# Patient Record
Sex: FEMALE | NOT HISPANIC OR LATINO | Employment: OTHER | URBAN - METROPOLITAN AREA
[De-identification: names, ages, dates, MRNs, and addresses within clinical notes are randomized per-mention and may not be internally consistent; named-entity substitution may affect disease eponyms.]

---

## 2020-12-02 ENCOUNTER — LAB REQUISITION (OUTPATIENT)
Dept: LAB | Facility: HOSPITAL | Age: 70
End: 2020-12-02

## 2020-12-02 DIAGNOSIS — Z00.00 ENCOUNTER FOR GENERAL ADULT MEDICAL EXAMINATION WITHOUT ABNORMAL FINDINGS: ICD-10-CM

## 2020-12-02 PROCEDURE — U0004 COV-19 TEST NON-CDC HGH THRU: HCPCS | Performed by: OPHTHALMOLOGY

## 2020-12-03 LAB — SARS-COV-2 RNA RESP QL NAA+PROBE: NOT DETECTED

## 2024-03-05 ENCOUNTER — APPOINTMENT (OUTPATIENT)
Dept: GENERAL RADIOLOGY | Facility: HOSPITAL | Age: 74
End: 2024-03-05
Payer: MEDICARE

## 2024-03-05 ENCOUNTER — HOSPITAL ENCOUNTER (OUTPATIENT)
Facility: HOSPITAL | Age: 74
Discharge: HOME | End: 2024-03-05
Attending: EMERGENCY MEDICINE | Admitting: EMERGENCY MEDICINE
Payer: MEDICARE

## 2024-03-05 VITALS
HEART RATE: 89 BPM | BODY MASS INDEX: 32.78 KG/M2 | DIASTOLIC BLOOD PRESSURE: 78 MMHG | TEMPERATURE: 97.9 F | SYSTOLIC BLOOD PRESSURE: 138 MMHG | OXYGEN SATURATION: 95 % | RESPIRATION RATE: 20 BRPM | WEIGHT: 204 LBS | HEIGHT: 66 IN

## 2024-03-05 DIAGNOSIS — J18.9 PNEUMONIA OF RIGHT MIDDLE LOBE DUE TO INFECTIOUS ORGANISM: Primary | ICD-10-CM

## 2024-03-05 DIAGNOSIS — I10 ELEVATED BLOOD PRESSURE READING WITH DIAGNOSIS OF HYPERTENSION: ICD-10-CM

## 2024-03-05 LAB
FLUAV SUBTYP SPEC NAA+PROBE: NOT DETECTED
FLUBV RNA ISLT QL NAA+PROBE: NOT DETECTED
RSV RNA NPH QL NAA+NON-PROBE: NOT DETECTED
SARS-COV-2 RNA RESP QL NAA+PROBE: NOT DETECTED

## 2024-03-05 PROCEDURE — 87637 SARSCOV2&INF A&B&RSV AMP PRB: CPT

## 2024-03-05 PROCEDURE — G0463 HOSPITAL OUTPT CLINIC VISIT: HCPCS | Performed by: NURSE PRACTITIONER

## 2024-03-05 PROCEDURE — 71045 X-RAY EXAM CHEST 1 VIEW: CPT

## 2024-03-05 RX ORDER — ALBUTEROL SULFATE 90 UG/1
2 AEROSOL, METERED RESPIRATORY (INHALATION) EVERY 4 HOURS PRN
Qty: 6.7 G | Refills: 0 | Status: SHIPPED | OUTPATIENT
Start: 2024-03-05

## 2024-03-05 RX ORDER — AMOXICILLIN AND CLAVULANATE POTASSIUM 875; 125 MG/1; MG/1
1 TABLET, FILM COATED ORAL EVERY 12 HOURS
Qty: 14 TABLET | Refills: 0 | Status: SHIPPED | OUTPATIENT
Start: 2024-03-05 | End: 2024-03-12

## 2024-03-05 RX ORDER — PREDNISONE 20 MG/1
40 TABLET ORAL DAILY
Qty: 10 TABLET | Refills: 0 | Status: SHIPPED | OUTPATIENT
Start: 2024-03-05 | End: 2024-03-10

## 2024-03-05 RX ORDER — AZITHROMYCIN 250 MG/1
TABLET, FILM COATED ORAL
Qty: 6 TABLET | Refills: 0 | Status: SHIPPED | OUTPATIENT
Start: 2024-03-05

## 2024-03-05 NOTE — DISCHARGE INSTRUCTIONS
"Virus precautions, simple things to do at home to help with illness    You have been diagnosed with a non-COVID-19 viral illness, supportive care recommended.    Wash/sanitize common household surfaces with antibacterial wipes.  Especially door knobs, light switches.    Change bed linens and wash bath towels/washcloths    Frequent handwashing    Cough/sneeze into your sleeve    Treat fever every 6-8 hours with age appropriate Tylenol (generic acetaminophen) or Ibuprofen according to package directions.      Over-the-counter medications for symptomatic relief may include: Mucinex (maximum 3 days), Sudafed, DayQuil/NyQuil, flonase nasal spray.  If you have history of high blood pressure please use caution with these medications.  Check with pharmacist for recommendations.  Coricidin has brand \"HBP\" which is better for patient's with high blood pressure.     Over-the-counter supplements including vitamin C, zinc  may also be helpful.      Antibiotics as prescribed  Inhaler as needed  Steroids as prescribed    Return Precautions    Although you are being discharged from the ED today, I encourage you to return for worsening symptoms.  Things can, and do, change such that treatment at home with medication may not be adequate.      Specifically, return for any of the following:    Chest pain, shortness of breath, pain or nausea and vomiting not controlled by medications provided.    Please make a follow up with your Primary Care Provider for a blood pressure recheck.     "

## 2024-03-05 NOTE — [facility] PROVIDER NOTE
EMERGENCY DEPARTMENT ENCOUNTER    Room Number:  09/09  Date seen:  3/5/2024  Time seen: 12:34 EST  PCP: Leyda Escamilla APRN  Historian: patient, daughter  Pt speaks Nepali, understands English.  Requests daughter to help interpret    Discussed/obtained information from independent historians: n/a    HPI:  Chief complaint:cough, URI  A complete HPI/ROS/PMH/PSH/SH/FH are unobtainable due to:   Context:Tana Rangel is a 73 y.o. female with history of hypertension and tobacco abuse who presents to the ED with c/o days of moderate cough, fever, headaches and bodyaches.  Symptoms are not made better or worse by anything.  She does continue to use a vape device for tobacco but is reported decreased use.  She denies any known history of COPD.  Her symptoms are not made better by over-the-counter Delsym.  She denies any dyspnea on exertion    External (non-ED) record review: No recent notes or visit    Chronic or social conditions impacting care: Not applicable    ALLERGIES  Patient has no known allergies.    PAST MEDICAL HISTORY  Active Ambulatory Problems     Diagnosis Date Noted    No Active Ambulatory Problems     Resolved Ambulatory Problems     Diagnosis Date Noted    No Resolved Ambulatory Problems     No Additional Past Medical History       PAST SURGICAL HISTORY  No past surgical history on file.    FAMILY HISTORY  No family history on file.    SOCIAL HISTORY  Social History     Socioeconomic History    Marital status: Unknown       REVIEW OF SYSTEMS  Review of Systems    All systems reviewed and negative except for those discussed in HPI.     PHYSICAL EXAM    I have reviewed the triage vital signs and nursing notes.  Vitals:    03/05/24 1137   BP: (!) 185/61   Pulse: 89   Resp: 20   Temp: 97.9 °F (36.6 °C)   SpO2: 95%     Physical Exam    GENERAL: not distressed  HENT: nares patent, voice normal  EYES: no scleral icterus  NECK: no ROM limitations  CV: regular rhythm, regular rate, no  murmur  RESPIRATORY: normal effort, right lower lobe Rales.  Mild wheezing that is expiratory  ABDOMEN: soft  : deferred  MUSCULOSKELETAL: no deformity  NEURO: alert, moves all extremities, follows commands  SKIN: warm, dry    LAB RESULTS  Recent Results (from the past 24 hour(s))   COVID-19 and FLU A/B PCR, 1 HR TAT - Swab, Nasopharynx    Collection Time: 03/05/24 11:39 AM    Specimen: Nasopharynx; Swab   Result Value Ref Range    COVID19 Not Detected Not Detected - Ref. Range    Influenza A PCR Not Detected Not Detected    Influenza B PCR Not Detected Not Detected       Ordered the above labs and independently interpreted results.  My findings will be discussed in the ED course or medical decision making section below    RADIOLOGY RESULTS  XR Chest 1 View    Result Date: 3/5/2024  XR CHEST 1 VW Date of Exam: 3/5/2024 12:25 PM EST Indication: cough, URI x 6 days Comparison: None available. Findings: The trachea is midline. The heart is within normal limits in size. Patchy airspace disease in the right midlung region suspicious for pneumonia. There is also a faint round nodular opacity. Cannot exclude a pulmonary nodule. The left lung is clear. There  are no pleural effusions     Impression: Patchy right-sided airspace disease suspicious for pneumonia. Follow-up to complete resolution or CT chest correlation recommended exclude a pulmonary nodule Electronically Signed: Darron Ross MD  3/5/2024 12:50 PM EST  Workstation ID: [id number]      Ordered the above noted radiological studies.  Independently interpreted by me.  My findings will be discussed in the medical decision section below.     PROGRESS, DATA ANALYSIS, CONSULTS AND MEDICAL DECISION MAKING    Please note that this section constitutes my independent interpretation of clinical data including lab results, radiology, EKG's.  This constitutes my independent professional opinion regarding differential diagnosis and management of this patient.  It may  include any factors such as history from outside sources, review of external records, social determinants of health, management of medications, response to those treatments, and discussions with other providers.    ED Course as of 03/05/24 1309   Tue Mar 05, 2024   1303 I viewed chest x-ray in PACS.  My independent interpretation is patchy airspace disease right concerning for pneumonia.  This does go along with the patient's symptoms.    I did recheck the patient's blood pressure at time of my exam and it was 138/78.  The patient does have hypertension and reports compliance with her medications. [EW]      ED Course User Index  [EW] Marleni Osorio APRN     Orders placed during this visit:  Orders Placed This Encounter   Procedures    COVID-19 and FLU A/B PCR, 1 HR TAT - Swab, Nasopharynx    RSV PCR - Swab, Nasopharynx    XR Chest 1 View            Medical Decision Making  Amount and/or Complexity of Data Reviewed  Radiology: ordered.    Differential diagnoses considered include viral URI, COVID, influenza, RSV, pneumonia, COPD exacerbation.  Chest x-ray does show a right middle lobe pneumonia.  Patient is not hypoxic or tachycardic.  Curb 66 has low risk for needing admission for pneumonia.  She is tolerating p.o.  I have prescribed Augmentin and azithromycin.  The azithromycin will cover for atypicals.  Her COVID and influenza testing here negative.  I do suspect a degree of underlying COPD due to long-term tobacco abuse even though she vapes now.  She does have primary care she can follow-up with.  I did also send her home with prednisone and an inhaler.        DIAGNOSIS  Final diagnoses:   Pneumonia of right middle lobe due to infectious organism   Elevated blood pressure reading with diagnosis of hypertension          Medication List        New Prescriptions      albuterol sulfate  (90 Base) MCG/ACT inhaler  Commonly known as: PROVENTIL HFA;VENTOLIN HFA;PROAIR HFA  Inhale 2 puffs Every 4 (Four) Hours  As Needed for Shortness of Air or Wheezing.     amoxicillin-clavulanate 875-125 MG per tablet  Commonly known as: AUGMENTIN  Take 1 tablet by mouth Every 12 (Twelve) Hours for 7 days.     azithromycin 250 MG tablet  Commonly known as: Zithromax Z-Adam  Take 2 tablets the first day, then 1 tablet daily for 4 days.     predniSONE 20 MG tablet  Commonly known as: DELTASONE  Take 2 tablets by mouth Daily for 5 days.               Where to Get Your Medications        These medications were sent to [facility]/pharmacy #[id number] - [place], [place] - [address]  - [phone]  - [phone]   [address], [place] [address]      Phone: [phone]   albuterol sulfate  (90 Base) MCG/ACT inhaler  amoxicillin-clavulanate 875-125 MG per tablet  azithromycin 250 MG tablet  predniSONE 20 MG tablet         FOLLOW-UP  Leyda Escamilla APRN  [address] [facility] [place] [address] [phone]    Schedule an appointment as soon as possible for a visit in 3 days  As needed, If symptoms worsen        Latest Documented Vital Signs:  As of 13:09 EST  BP- (!) 185/61 HR- 89 Temp- 97.9 °F (36.6 °C) O2 sat- 95%    Appropriate PPE utilized throughout this patient encounter to include mask, if indicated, per current protocol. Hand hygiene was performed before donning PPE and after removal when leaving the room.    Please note that portions of this were completed with a voice recognition program.     Note Disclaimer: At [facility], we believe that sharing information builds trust and better relationships. You are receiving this note because you are receiving care at [facility] or recently visited. It is possible you will see health information before a provider has talked with you about it. This kind of information can be easy to misunderstand. To help you fully understand what it means for your health, we urge you to discuss this note with your provider.